# Patient Record
Sex: MALE | Race: WHITE | NOT HISPANIC OR LATINO
[De-identification: names, ages, dates, MRNs, and addresses within clinical notes are randomized per-mention and may not be internally consistent; named-entity substitution may affect disease eponyms.]

---
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
## 2021-06-01 VITALS — HEIGHT: 72 IN | WEIGHT: 167.6 LBS | BODY MASS INDEX: 22.7 KG/M2

## 2021-06-18 NOTE — PROGRESS NOTES
[facility] Well Child Check    ASSESSMENT & PLAN  Duy Lemos is a 18 y.o. who has normal growth and normal development.    Diagnoses and all orders for this visit:    Encounter for annual health examination  -     Lipid Cascade RANDOM  -     Hearing Screening  -     Vision Screening  -     PHQ9 Depression Screen        Handout given on meningococcus type B vaccine.   Please discuss this at home.   I do recommend the vaccine.   You can come back in for a nurse only visit for this 1.   A second dose is needed 1 month later.    You should follow-up with your allergist concerning the nut allergy.    Check and be sure that the epinephrine that you have is not .    Return in 1 year (on 2019) for Well Child Check.     Acetaminophen and ibuprofen doses:     Acetaminophen (Tylenol) 650 mg every 4 hours as needed for fever or pain.       or   Ibuprofen 400 mg every 6 hours as needed for fever or pain.      IMMUNIZATIONS/LABS  Bexsero vaccine discussed    REFERRALS  Dental:  Recommend routine dental care as appropriate.  Other:  No additional referrals were made at this time.    ANTICIPATORY GUIDANCE  I have reviewed age appropriate anticipatory guidance.    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 No    Do you have any forms that need to be filled out? No        Do you have any significant health concerns in your family history?: No  No family history on file.  Since your last visit, have there been any major changes in your family, such as a move, job change, separation, divorce, or death in the family?: No  Has a lack of transportation kept you from medical appointments?: No    Home  Who lives in your home?:  Mom and dad, sister  Social History     Social  History Narrative     Do you have any concerns about losing your housing?: No  Is your housing safe and comfortable?: Yes  Do you have any trouble with sleep?:  No    Education  What school do you child attend?:  [employer]   What grade are you in?:  12th  How do you perform in school (grades, behavior, attention, homework?: no     Eating  Do you eat regular meals including fruits and vegetables?:  yes  What are you drinking (cow's milk, water, soda, juice, sports drinks, energy drinks, etc)?: cow's milk- 2%, water and juice  Have you been worried that you don't have enough food?: No  Do you have concerns about your body or appearance?:  No    Activities  Do you have friends?:  yes  Do you get at least one hour of physical activity per day?:  yes  How many hours a day are you in front of a screen other than for schoolwork (computer, TV, phone)?:  1  What do you do for exercise?:  Gym,swimming  Do you have interest/participate in community activities/volunteers/school sports?:  no    MENTAL HEALTH SCREENING  PHQ-2 Total Score: 0 (6/11/2018 11:00 AM)  PHQ-9 Total Score: 0 (6/11/2018 11:00 AM)    VISION/HEARING  Vision: Completed. See Results  Hearing:  Completed. See Results     Hearing Screening    125Hz 250Hz 500Hz 1000Hz 2000Hz 3000Hz 4000Hz 6000Hz 8000Hz   Right ear:   20 20 20  20 20    Left ear:   20 20 20  20 20       Visual Acuity Screening    Right eye Left eye Both eyes   Without correction: 20/12.5 20/12.5 20/12.5   With correction:      Comments: Plus billy-passed      TB Risk Assessment:  The patient and/or parent/guardian answer positive to:  patient and/or parent/guardian answer 'no' to all screening TB questions    Dyslipidemia Risk Screening  Have either of your parents or any of your grandparents had a stroke or heart attack before age 55?: No  Any parents with high cholesterol or currently taking medications to treat?: No     Dental  When was the last time you saw the dentist?: 3-6 months ago    "Fluoride not indicated today    Patient Active Problem List   Diagnosis     Allergy To Nuts     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  Blood Pressure: 112/58  Blood pressure percentiles are 15 % systolic and 10 % diastolic based on NHBPEP's 4th Report. Blood pressure percentile targets: 90: 137/88, 95: 141/92, 99 + 5 mmH/105.      11 to 18 Year [facility] Well Child Check        Physical Exam:    Gen: Awake, Alert and Cooperative  Head: Normocephalic  Eyes: PERRLA and EOM, RR++, symmetric light reflex  ENT: Normal pearly TMs bilaterally and oropharynx clear  Neck: supple  Lungs: Clear to auscultation bilaterally  CV: Normal S1 & S2 with regular rate and rhythm, no murmur present; femoral pulses 2+ bilaterally  Abd: Soft, nontender, non distended, no masses or hepatosplenomegaly  Anus: Normal  Spine:    Spine straight without curvature noted  : Normal male genitalia, testes descended   Lev:  MSK: Moving all extremities and normal tone      Neuro:    DTRs 2+/4+  Skin: No rashes or lesions         REFERRALS  Dental:  Recommend routine dental care as appropriate.  Other:  No additional referrals were made at this time.    ANTICIPATORY GUIDANCE      Nutrition: Balanced diet, skim milk     Health: Drugs, Smoking, Alcohol and Dental Care  Safety: Seat Belts and Bike/Motorcycle Helmets  Sexuality: Safe Sex, STD's and Contraception        Dagoberto Ley MD  .      "

## 2024-01-26 ENCOUNTER — OFFICE VISIT (OUTPATIENT)
Dept: FAMILY MEDICINE | Facility: CLINIC | Age: 24
End: 2024-01-26
Payer: COMMERCIAL

## 2024-01-26 ENCOUNTER — HOSPITAL ENCOUNTER (OUTPATIENT)
Dept: GENERAL RADIOLOGY | Facility: HOSPITAL | Age: 24
Discharge: HOME | End: 2024-01-26
Attending: PHYSICIAN ASSISTANT | Admitting: PHYSICIAN ASSISTANT
Payer: COMMERCIAL

## 2024-01-26 VITALS
OXYGEN SATURATION: 97 % | HEART RATE: 93 BPM | RESPIRATION RATE: 16 BRPM | TEMPERATURE: 98.3 F | WEIGHT: 171.4 LBS | SYSTOLIC BLOOD PRESSURE: 147 MMHG | DIASTOLIC BLOOD PRESSURE: 76 MMHG | BODY MASS INDEX: 23.41 KG/M2

## 2024-01-26 DIAGNOSIS — J02.0 STREP THROAT: Primary | ICD-10-CM

## 2024-01-26 DIAGNOSIS — R07.0 THROAT PAIN: ICD-10-CM

## 2024-01-26 DIAGNOSIS — R50.9 FEVER, UNSPECIFIED: ICD-10-CM

## 2024-01-26 DIAGNOSIS — B34.9 VIRAL ILLNESS: ICD-10-CM

## 2024-01-26 DIAGNOSIS — Z11.52 ENCOUNTER FOR SCREENING FOR COVID-19: ICD-10-CM

## 2024-01-26 LAB
DEPRECATED S PYO AG THROAT QL EIA: POSITIVE
FLUAV AG SPEC QL IA: NEGATIVE
FLUBV AG SPEC QL IA: NEGATIVE
SARS-COV-2 RNA RESP QL NAA+PROBE: NEGATIVE

## 2024-01-26 PROCEDURE — 87635 SARS-COV-2 COVID-19 AMP PRB: CPT | Performed by: PHYSICIAN ASSISTANT

## 2024-01-26 PROCEDURE — 99204 OFFICE O/P NEW MOD 45 MIN: CPT | Performed by: PHYSICIAN ASSISTANT

## 2024-01-26 PROCEDURE — 71046 X-RAY EXAM CHEST 2 VIEWS: CPT

## 2024-01-26 PROCEDURE — 87804 INFLUENZA ASSAY W/OPTIC: CPT | Performed by: PHYSICIAN ASSISTANT

## 2024-01-26 PROCEDURE — 87880 STREP A ASSAY W/OPTIC: CPT | Performed by: PHYSICIAN ASSISTANT

## 2024-01-26 RX ORDER — CEFPODOXIME PROXETIL 200 MG/1
200 TABLET, FILM COATED ORAL
COMMUNITY
Start: 2024-01-17 | End: 2024-01-27

## 2024-01-26 RX ORDER — PENICILLIN V POTASSIUM 500 MG/1
500 TABLET, FILM COATED ORAL 2 TIMES DAILY
Qty: 20 TABLET | Refills: 0 | Status: SHIPPED | OUTPATIENT
Start: 2024-01-26 | End: 2024-02-05

## 2024-01-26 NOTE — PROGRESS NOTES
Patient presents with:  Throat Pain: Dx with pneumonia last week (was given 2 rx, still on cefpodoxime), now having more coughing and throat pain, fever last night (none today), no chest pain      Clinical Decision Making:  Patient is treated for pneumonia from his last office visit in the [facility] emergency department on 1/17/2024 and now developed new symptoms over the last 2 days.  Repeat chest x-ray showed improvement of the pneumonia and there was no noted infiltrate or consolidations noted on x-ray. Strep test was obtained and was positive. Patient is treated with pen VK for a more narrow spectrum treatment of the strep.  Patient may have had treatment with Zithromax and the cefpodoxime or had new exposure to strep.  COVID-19 screening test is negative and Influenza is negative. Symptomatic care was gone over. Expected course of resolution and indication for return was gone over and questions were answered to patient/parent's satisfaction before discharge.        ICD-10-CM    1. Strep throat  J02.0 penicillin V (VEETID) 500 MG tablet      2. Throat pain  R07.0 Streptococcus A Rapid Screen w/Reflex to PCR - Clinic Collect     CANCELED: Streptococcus A Rapid Screen w/Reflex to PCR      3. Fever, unspecified  R50.9 Symptomatic COVID-19 Virus (Coronavirus) by PCR Nose     Influenza A & B Antigen - Clinic Collect      4. Viral illness  B34.9 XR Chest 2 Views     CANCELED: Influenza A & B Antigen      5. Encounter for screening for COVID-19  Z11.52 CANCELED: Symptomatic COVID-19 Virus (Coronavirus) by PCR Nose          Patient Instructions   Suggested increased rest increased fluids and bedside humidification  Over-the-counter Tylenol for comfort.  Follow packaging directions  Over-the-counter throat lozenges with benzocaine, such as Cepacol, may be used if indicated and is not a choking hazard based on age.    Follow packaging directions.    Do not overuse the benzocaine as it will dry the throat and make it  uncomfortable.  May use the throat lozenges one lozenge per hour.  May use hard candies or lemon drops etc. to keep the saliva flowing for comfort until the next hour interval dosing for the lozenge  Noncontagious after 24 hours on the antibiotic.  Change toothbrush out after 48 hours to avoid reinfecting the mouth.  Follow-up after completion of the antibiotic if not completely resolved with symptoms or new symptoms or concerns arise.        HPI:  Duy Lemos is a 23 year old male who presents today for evaluation of throat odynophagia and myalgias arthralgias fatigue loss of appetite and cough over the last 2 days.  Patient has had a fever with temperature max of 100.9 yesterday.  Had no fever today.  Patient was recently treated for pneumonia in the [facility] system with Zithromax and cefpodoxime . No reported diarrhea, skin rash, abdominal pain or urinary symptoms.      No known sick contacts for strep throat.    Has not tried treatment for this over-the-counter.    History obtained from chart review and the patient.    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    Social History     Tobacco Use    Smoking status: Never    Smokeless tobacco: Never   Substance Use Topics    Alcohol use: Not on file       Review of Systems  As above in HPI otherwise negative.    Vitals:    01/26/24 1119   BP: (!) 147/76   Pulse: 93   Resp: 16   Temp: 98.3  F (36.8  C)   TempSrc: Oral   SpO2: 97%   Weight: 77.7 kg (171 lb 6.4 oz)       General: Patient is resting comfortably no acute distress is afebrile  HEENT: Head is normocephalic atraumatic   eyes are PERRL EOMI sclera anicteric   TMs are clear bilaterally  Throat is with pharyngeal wall erythema and no exudate  No cervical lymphadenopathy present  LUNGS: Clear to auscultation bilaterally  HEART: Regular rate and rhythm  Skin: Without rash non-diaphoretic    Physical Exam      Labs:  Results for orders placed or  performed in visit on 01/26/24   XR Chest 2 Views     Status: None    Narrative    EXAM: XR CHEST 2 VIEWS  LOCATION: [facility]  DATE: 1/26/2024    INDICATION: treated for pnumonia one week ago. New illness symtoms over last two days  COMPARISON: 01/17/2024      Impression    IMPRESSION: Bibasilar peribronchiolar opacities have decreased with residua in the right medial lung base. No effusion. Upper lobes remain clear. Heart and pulmonary vascularity are normal.   Symptomatic COVID-19 Virus (Coronavirus) by PCR Nose     Status: Normal    Specimen: Nose; Swab   Result Value Ref Range    SARS CoV2 PCR Negative Negative    Narrative    Testing was performed using the [employer] SARS-CoV-2 Assay on the  [employer] Instrument System. Additional information about this  Emergency Use Authorization (EUA) assay can be found via the Lab  Guide. This test should be ordered for the detection of SARS-CoV-2 in  individuals who meet SARS-CoV-2 clinical and/or epidemiological  criteria. Test performance is unknown in asymptomatic patients. This  test is for in vitro diagnostic use under the FDA EUA for  laboratories certified under CLIA to perform high complexity testing.  This test has not been FDA cleared or approved. A negative result  does not rule out the presence of PCR inhibitors in the specimen or  target RNA in concentration below the limit of detection for the  assay. The possibility of a false negative should be considered if  the patient's recent exposure or clinical presentation suggests  COVID-19. This test was validated by the [facility]. This laboratory is certified under  the Clinical Laboratory Improvement Amendments of 1988 (CLIA-88) as  qualified to perform high complexity laboratory testing.   Streptococcus A Rapid Screen w/Reflex to PCR - Clinic Collect     Status: Abnormal    Specimen: Throat; Swab   Result Value Ref Range    Group A Strep  antigen Positive (A) Negative   Influenza A & B Antigen - Clinic Collect     Status: Normal    Specimen: Nose; Swab   Result Value Ref Range    Influenza A antigen Negative Negative    Influenza B antigen Negative Negative    Narrative    Test results must be correlated with clinical data. If necessary, results should be confirmed by a molecular assay or viral culture.     At the end of the encounter, I discussed results, diagnosis, medications. Discussed red flags for immediate return to clinic/ER, as well as indications for follow up if no improvement. Patient understood and agreed to plan. Patient was stable for discharge.   140